# Patient Record
Sex: FEMALE | Race: WHITE | NOT HISPANIC OR LATINO | Employment: UNEMPLOYED | URBAN - METROPOLITAN AREA
[De-identification: names, ages, dates, MRNs, and addresses within clinical notes are randomized per-mention and may not be internally consistent; named-entity substitution may affect disease eponyms.]

---

## 2023-06-06 ENCOUNTER — OFFICE VISIT (OUTPATIENT)
Dept: ORTHOPEDICS | Facility: CLINIC | Age: 68
End: 2023-06-06
Payer: MEDICARE

## 2023-06-06 VITALS — WEIGHT: 236 LBS | HEIGHT: 61 IN | BODY MASS INDEX: 44.56 KG/M2

## 2023-06-06 DIAGNOSIS — S82.154A CLOSED NONDISPLACED FRACTURE OF RIGHT TIBIAL TUBEROSITY, INITIAL ENCOUNTER: Primary | ICD-10-CM

## 2023-06-06 DIAGNOSIS — S82.154A CLOSED NONDISPLACED FRACTURE OF RIGHT TIBIAL TUBEROSITY, INITIAL ENCOUNTER: ICD-10-CM

## 2023-06-06 PROCEDURE — 99999 PR PBB SHADOW E&M-EST. PATIENT-LVL III: CPT | Mod: PBBFAC,,, | Performed by: ORTHOPAEDIC SURGERY

## 2023-06-06 PROCEDURE — 99203 PR OFFICE/OUTPT VISIT, NEW, LEVL III, 30-44 MIN: ICD-10-PCS | Mod: S$PBB,,, | Performed by: ORTHOPAEDIC SURGERY

## 2023-06-06 PROCEDURE — 99213 OFFICE O/P EST LOW 20 MIN: CPT | Mod: PBBFAC,PN | Performed by: ORTHOPAEDIC SURGERY

## 2023-06-06 PROCEDURE — 99999 PR PBB SHADOW E&M-EST. PATIENT-LVL III: ICD-10-PCS | Mod: PBBFAC,,, | Performed by: ORTHOPAEDIC SURGERY

## 2023-06-06 PROCEDURE — 99203 OFFICE O/P NEW LOW 30 MIN: CPT | Mod: S$PBB,,, | Performed by: ORTHOPAEDIC SURGERY

## 2023-06-06 NOTE — PROGRESS NOTES
Subjective:      Patient ID: Natalie Davis is a 68 y.o. female.    Chief Complaint: Injury, Pain, and Numbness of the Right Knee     68-year-old female presents for closed tibial tubercle fracture of right knee she slipped and fell yesterday onto the knee she presented to the emergency room she was placed in an immobilizer and here for follow up she is also a diabetic    Injury    Pain    Review of Systems   Musculoskeletal:  Positive for joint pain.       Objective:    Ortho Exam      Examination of the right knee the immobilizer was removed the skin was inspected she does have a bit of a abrasion over the tibial tubercle she has tenderness over the tibial tubercle otherwise neurovascularly intact        X-Ray Tibia Fibula 2 View Right  Narrative: EXAMINATION:  XR KNEE 3 VIEW RIGHT; XR TIBIA FIBULA 2 VIEW RIGHT    CLINICAL HISTORY:  Right knee injury.  Right leg injury    TECHNIQUE:  Four views of the right knee.    AP and lateral views of the right tibia and fibula.    COMPARISON:  None available    FINDINGS:  There does appear to be a nondisplaced fracture at the tibial tubercle level.  There is no subluxation or dislocation identified.  There is no significant joint effusion at the knee.  There are degenerative changes noted of the knee.  The visualized right ankle appears intact.  No radiopaque foreign body is noted.  Impression: 1. Nondisplaced fracture of the tibial tubercle.    Electronically signed by: Aman Pham MD  Date:    06/05/2023  Time:    16:53  X-Ray Knee 3 View Right  Narrative: EXAMINATION:  XR KNEE 3 VIEW RIGHT; XR TIBIA FIBULA 2 VIEW RIGHT    CLINICAL HISTORY:  Right knee injury.  Right leg injury    TECHNIQUE:  Four views of the right knee.    AP and lateral views of the right tibia and fibula.    COMPARISON:  None available    FINDINGS:  There does appear to be a nondisplaced fracture at the tibial tubercle level.  There is no subluxation or dislocation identified.  There is no  significant joint effusion at the knee.  There are degenerative changes noted of the knee.  The visualized right ankle appears intact.  No radiopaque foreign body is noted.  Impression: 1. Nondisplaced fracture of the tibial tubercle.    Electronically signed by: Aman Pham MD  Date:    06/05/2023  Time:    16:53  X-Ray Pelvis Routine AP  Narrative: EXAMINATION:  XR PELVIS ROUTINE AP    CLINICAL HISTORY:  Right hip pain s/p fall.    TECHNIQUE:  AP view of the pelvis was performed.    COMPARISON:  None.    FINDINGS:  No acute displaced fracture.  No dislocation or subluxation.  Sacroiliac joints and pubic symphysis are not widened.  No radiopaque soft tissue foreign body or acute soft tissue abnormality.  Impression: No definite acute displaced fracture or dislocation.    Electronically signed by: Anuel Lozano MD  Date:    06/05/2023  Time:    16:52  X-Ray Femur Ap/Lat Right  Narrative: EXAMINATION:  XR FEMUR 2 VIEW RIGHT    CLINICAL HISTORY:  Fall.    TECHNIQUE:  AP and lateral views of the right femur were performed.    COMPARISON:  None    FINDINGS:  No acute fracture.  No dislocation or subluxation.  No radiopaque foreign body or acute soft tissue abnormality.  Atherosclerotic calcifications are noted..  Impression: No definite evidence of acute displaced fracture or dislocation.    Electronically signed by: Anuel Lozano MD  Date:    06/05/2023  Time:    16:52  CT Head Without Contrast  Narrative: EXAMINATION:  CT HEAD WITHOUT CONTRAST    CLINICAL HISTORY:  Head trauma, minor.  Fall.    TECHNIQUE:  Low dose axial images were obtained through the head.  Coronal and sagittal reformations were also performed. Contrast was not administered.  Dose reduction techniques including automatic exposure control (AEC) were utilized.    Dose (DLP): 599 mGycm    COMPARISON:  CT head without contrast, 11/26/2020.    FINDINGS:  INTRACRANIAL: Stable age-appropriate parenchymal volume.  Scattered white matter  hypodensities, likely related to chronic microvascular ischemic change.  Gray-white differentiation is preserved.  No acute intracranial hemorrhage.  No hydrocephalus.  No intracranial mass effect.    SINUSES: Visualized paranasal sinuses are clear.  Trace bilateral mastoid fluid.    SKULL/SCALP: Visualized osseous structures are normal.    ORBITS: Mild bilateral proptosis, similar to prior study.  Impression: No acute intracranial abnormality.    Electronically signed by: Anuel Lozano MD  Date:    06/05/2023  Time:    16:05       My Findings:    X-Ray:  minimally displaced tibial tubercle fracture    MRI Review: N/A      Assessment:       Encounter Diagnosis   Name Primary?    Closed nondisplaced fracture of right tibial tuberosity, initial encounter          Plan:          We adjusted her immobilizer today she will remain in the immobilizer for 2 weeks I will follow up with her in 2 weeks' time and repeat x rays at that time    No orders of the defined types were placed in this encounter.            Past Medical History:   Diagnosis Date    Asthma     Diabetes mellitus     DM1    Hyperkalemia     Hyperlipemia     Hypertension     Renal disorder     Thyroid disease      Past Surgical History:   Procedure Laterality Date    CHOLECYSTECTOMY      COLON SURGERY      HYSTERECTOMY      NECK SURGERY      TONSILLECTOMY           Current Outpatient Medications:     diclofenac sodium (VOLTAREN) 1 % Gel, Apply 2 g topically 3 (three) times daily as needed., Disp: 100 g, Rfl: 0    HYDROcodone-acetaminophen (NORCO) 5-325 mg per tablet, Take 1 tablet by mouth every 6 (six) hours as needed for Pain., Disp: 20 tablet, Rfl: 0    lidocaine (LIDODERM) 5 %, Place 1 patch onto the skin once daily. Remove & Discard patch within 12 hours or as directed by MD, Disp: 10 patch, Rfl: 0  No current facility-administered medications for this visit.    Review of patient's allergies indicates:  No Known Allergies    History reviewed. No  pertinent family history.  Social History     Occupational History    Not on file   Tobacco Use    Smoking status: Former     Types: Cigarettes     Quit date: 2015     Years since quittin.5    Smokeless tobacco: Never   Substance and Sexual Activity    Alcohol use: Not Currently    Drug use: Not Currently    Sexual activity: Not Currently       JANIE Brice MD

## 2023-06-19 DIAGNOSIS — S82.154A CLOSED NONDISPLACED FRACTURE OF RIGHT TIBIAL TUBEROSITY, INITIAL ENCOUNTER: Primary | ICD-10-CM

## 2023-06-20 ENCOUNTER — HOSPITAL ENCOUNTER (OUTPATIENT)
Dept: RADIOLOGY | Facility: HOSPITAL | Age: 68
Discharge: HOME | End: 2023-06-20
Attending: ORTHOPAEDIC SURGERY
Payer: MEDICARE

## 2023-06-20 ENCOUNTER — OFFICE VISIT (OUTPATIENT)
Dept: ORTHOPEDICS | Facility: CLINIC | Age: 68
End: 2023-06-20
Payer: MEDICARE

## 2023-06-20 DIAGNOSIS — S82.154A CLOSED NONDISPLACED FRACTURE OF RIGHT TIBIAL TUBEROSITY, INITIAL ENCOUNTER: Primary | ICD-10-CM

## 2023-06-20 DIAGNOSIS — S82.154A CLOSED NONDISPLACED FRACTURE OF RIGHT TIBIAL TUBEROSITY, INITIAL ENCOUNTER: ICD-10-CM

## 2023-06-20 PROCEDURE — 73560 XR KNEE ORTHO RIGHT: ICD-10-PCS | Mod: 26,LT,, | Performed by: RADIOLOGY

## 2023-06-20 PROCEDURE — 99214 OFFICE O/P EST MOD 30 MIN: CPT | Mod: S$PBB,,, | Performed by: ORTHOPAEDIC SURGERY

## 2023-06-20 PROCEDURE — 99214 PR OFFICE/OUTPT VISIT, EST, LEVL IV, 30-39 MIN: ICD-10-PCS | Mod: S$PBB,,, | Performed by: ORTHOPAEDIC SURGERY

## 2023-06-20 PROCEDURE — 99999 PR PBB SHADOW E&M-EST. PATIENT-LVL II: ICD-10-PCS | Mod: PBBFAC,,, | Performed by: ORTHOPAEDIC SURGERY

## 2023-06-20 PROCEDURE — 99212 OFFICE O/P EST SF 10 MIN: CPT | Mod: PBBFAC,PN | Performed by: ORTHOPAEDIC SURGERY

## 2023-06-20 PROCEDURE — 73562 X-RAY EXAM OF KNEE 3: CPT | Mod: 26,RT,, | Performed by: RADIOLOGY

## 2023-06-20 PROCEDURE — 73560 X-RAY EXAM OF KNEE 1 OR 2: CPT | Mod: TC,PO,LT

## 2023-06-20 PROCEDURE — 99999 PR PBB SHADOW E&M-EST. PATIENT-LVL II: CPT | Mod: PBBFAC,,, | Performed by: ORTHOPAEDIC SURGERY

## 2023-06-20 PROCEDURE — 73560 X-RAY EXAM OF KNEE 1 OR 2: CPT | Mod: 26,LT,, | Performed by: RADIOLOGY

## 2023-06-20 PROCEDURE — 73562 XR KNEE ORTHO RIGHT: ICD-10-PCS | Mod: 26,RT,, | Performed by: RADIOLOGY

## 2023-06-20 NOTE — PROGRESS NOTES
Subjective:      Patient ID: Natalie Davis is a 68 y.o. female.    Chief Complaint: Pain and Injury of the Right Knee    68-year-old female with minimally displaced right tibial tubercle fracture presents for follow-up reports she is still having some pain but it is improving    Pain    Injury    Review of Systems   Musculoskeletal:  Positive for joint pain.       Objective:    Ortho Exam     Examination still demonstrates tenderness over the tibial tubercle but her extensor mechanism is intact        X-ray Knee Ortho Right  Narrative: EXAMINATION:  XR KNEE ORTHO RIGHT    CLINICAL HISTORY:  Nondisplaced fracture of right tibial tuberosity, initial encounter for closed fracture    TECHNIQUE:  AP standing of both knees, Merchant views of both knees as well as a lateral view of the right knee were performed.    COMPARISON:  None    FINDINGS:  There is degenerative arthrosis of the medial compartment of the right knee and lateral compartment of the left knee with joint space narrowing and periarticular osteophyte formation.  There is no significant change in cortical fracture of the anterior tibial tubercle.  There is no significant overlying soft tissue swelling.  The patellofemoral articulation is well maintained.  There are no signs of right knee joint effusion.  Impression: As above.    Electronically signed by: Easton Muse MD  Date:    06/20/2023  Time:    13:18       My Findings:    X-Ray:  Independent review of x-rays show no no further displacement of the tibial    MRI Review: N/A      Assessment:       Encounter Diagnosis   Name Primary?    Closed nondisplaced fracture of right tibial tuberosity, initial encounter Yes         Plan:         Patient is doing well with conservative care she will continue using an assistive device and physical therapy she is going to follow up with physician closer to her home    No orders of the defined types were placed in this encounter.            Past Medical History:    Diagnosis Date    Asthma     Diabetes mellitus     DM1    Hyperkalemia     Hyperlipemia     Hypertension     Renal disorder     Thyroid disease      Past Surgical History:   Procedure Laterality Date    CHOLECYSTECTOMY      COLON SURGERY      HYSTERECTOMY      NECK SURGERY      TONSILLECTOMY           Current Outpatient Medications:     diclofenac sodium (VOLTAREN) 1 % Gel, Apply 2 g topically 3 (three) times daily as needed., Disp: 100 g, Rfl: 0    HYDROcodone-acetaminophen (NORCO) 5-325 mg per tablet, Take 1 tablet by mouth every 6 (six) hours as needed for Pain., Disp: 20 tablet, Rfl: 0    lidocaine (LIDODERM) 5 %, Place 1 patch onto the skin once daily. Remove & Discard patch within 12 hours or as directed by MD, Disp: 10 patch, Rfl: 0    Review of patient's allergies indicates:  No Known Allergies    History reviewed. No pertinent family history.  Social History     Occupational History    Not on file   Tobacco Use    Smoking status: Former     Types: Cigarettes     Quit date: 2015     Years since quittin.5    Smokeless tobacco: Never   Substance and Sexual Activity    Alcohol use: Not Currently    Drug use: Not Currently    Sexual activity: Not Currently       JANIE Brice MD

## 2023-08-29 PROBLEM — F17.201 TOBACCO ABUSE, IN REMISSION: Status: ACTIVE | Noted: 2023-08-29

## 2023-08-29 PROBLEM — K44.9 HIATAL HERNIA: Status: ACTIVE | Noted: 2023-08-29

## 2023-08-29 PROBLEM — R13.14 PHARYNGOESOPHAGEAL DYSPHAGIA: Status: ACTIVE | Noted: 2023-08-29

## 2023-08-29 PROBLEM — J84.9 INTERSTITIAL LUNG DISEASE: Status: ACTIVE | Noted: 2023-08-29

## 2023-08-29 PROBLEM — R91.8 GROUND GLASS OPACITY PRESENT ON IMAGING OF LUNG: Status: ACTIVE | Noted: 2023-08-29

## 2023-08-29 PROBLEM — G47.10 EXCESSIVE SLEEPINESS: Status: ACTIVE | Noted: 2023-08-29

## 2023-10-04 PROBLEM — I27.23 WHO GROUP 3 PULMONARY ARTERIAL HYPERTENSION: Status: ACTIVE | Noted: 2023-10-04

## 2023-10-04 PROBLEM — J43.2 CENTRIACINAR EMPHYSEMA: Status: ACTIVE | Noted: 2023-10-04

## 2024-03-21 PROBLEM — Z79.4 TYPE 2 DIABETES MELLITUS WITH DIABETIC PERIPHERAL ANGIOPATHY WITHOUT GANGRENE, WITH LONG-TERM CURRENT USE OF INSULIN: Status: ACTIVE | Noted: 2024-03-21

## 2024-03-21 PROBLEM — E78.00 HYPERCHOLESTEROLEMIA: Status: ACTIVE | Noted: 2024-03-21

## 2024-03-21 PROBLEM — R94.2 ABNORMAL PFTS: Status: ACTIVE | Noted: 2024-03-21

## 2024-03-21 PROBLEM — E11.51 TYPE 2 DIABETES MELLITUS WITH DIABETIC PERIPHERAL ANGIOPATHY WITHOUT GANGRENE, WITH LONG-TERM CURRENT USE OF INSULIN: Status: ACTIVE | Noted: 2024-03-21

## 2024-03-21 PROBLEM — E03.9 ACQUIRED HYPOTHYROIDISM: Status: ACTIVE | Noted: 2024-03-21

## 2024-07-19 PROBLEM — E66.01 SEVERE OBESITY (BMI >= 40): Status: ACTIVE | Noted: 2024-07-19

## 2024-07-19 PROBLEM — R80.9 PROTEINURIA: Status: ACTIVE | Noted: 2024-07-19

## 2024-07-19 PROBLEM — E78.5 HYPERLIPIDEMIA: Status: ACTIVE | Noted: 2024-07-19

## 2024-07-19 PROBLEM — I50.31 ACUTE DIASTOLIC HEART FAILURE: Status: ACTIVE | Noted: 2024-07-19

## 2024-07-19 PROBLEM — G47.33 OBSTRUCTIVE SLEEP APNEA: Status: ACTIVE | Noted: 2024-07-19

## 2024-07-19 PROBLEM — J84.9 ILD (INTERSTITIAL LUNG DISEASE): Status: ACTIVE | Noted: 2024-07-19

## 2024-07-19 PROBLEM — E03.9 HYPOTHYROIDISM: Status: ACTIVE | Noted: 2024-07-19

## 2024-07-19 PROBLEM — D72.829 LEUKOCYTOSIS: Status: ACTIVE | Noted: 2024-07-19

## 2024-07-19 PROBLEM — Z71.89 ACP (ADVANCE CARE PLANNING): Status: ACTIVE | Noted: 2024-07-19

## 2024-07-19 PROBLEM — I10 HYPERTENSION: Status: ACTIVE | Noted: 2024-07-19

## 2024-07-19 PROBLEM — E11.9 DIABETES MELLITUS: Status: ACTIVE | Noted: 2024-07-19

## 2024-07-19 PROBLEM — J44.9 COPD (CHRONIC OBSTRUCTIVE PULMONARY DISEASE): Status: ACTIVE | Noted: 2024-07-19

## 2024-07-20 PROBLEM — R79.89 ELEVATED TROPONIN: Status: ACTIVE | Noted: 2024-07-20

## 2024-07-20 PROBLEM — J96.10 CHRONIC RESPIRATORY FAILURE: Status: ACTIVE | Noted: 2024-07-20

## 2024-07-29 ENCOUNTER — OFFICE VISIT (OUTPATIENT)
Dept: UROLOGY | Facility: CLINIC | Age: 69
End: 2024-07-29
Payer: MEDICARE

## 2024-07-29 ENCOUNTER — TELEPHONE (OUTPATIENT)
Dept: UROLOGY | Facility: CLINIC | Age: 69
End: 2024-07-29
Payer: MEDICARE

## 2024-07-29 VITALS — WEIGHT: 243.63 LBS | BODY MASS INDEX: 46 KG/M2 | HEIGHT: 61 IN

## 2024-07-29 DIAGNOSIS — R32 URINARY INCONTINENCE, UNSPECIFIED TYPE: Primary | ICD-10-CM

## 2024-07-29 LAB — POC RESIDUAL URINE VOLUME: 0 ML (ref 0–100)

## 2024-07-29 PROCEDURE — 3288F FALL RISK ASSESSMENT DOCD: CPT | Mod: CPTII,S$GLB,, | Performed by: UROLOGY

## 2024-07-29 PROCEDURE — 4010F ACE/ARB THERAPY RXD/TAKEN: CPT | Mod: CPTII,S$GLB,, | Performed by: UROLOGY

## 2024-07-29 PROCEDURE — 99999 PR PBB SHADOW E&M-EST. PATIENT-LVL III: CPT | Mod: PBBFAC,,, | Performed by: UROLOGY

## 2024-07-29 PROCEDURE — 51798 US URINE CAPACITY MEASURE: CPT | Mod: S$GLB,,, | Performed by: UROLOGY

## 2024-07-29 PROCEDURE — 3008F BODY MASS INDEX DOCD: CPT | Mod: CPTII,S$GLB,, | Performed by: UROLOGY

## 2024-07-29 PROCEDURE — 1111F DSCHRG MED/CURRENT MED MERGE: CPT | Mod: CPTII,S$GLB,, | Performed by: UROLOGY

## 2024-07-29 PROCEDURE — 99204 OFFICE O/P NEW MOD 45 MIN: CPT | Mod: S$GLB,,, | Performed by: UROLOGY

## 2024-07-29 PROCEDURE — 1101F PT FALLS ASSESS-DOCD LE1/YR: CPT | Mod: CPTII,S$GLB,, | Performed by: UROLOGY

## 2024-07-29 PROCEDURE — 3051F HG A1C>EQUAL 7.0%<8.0%: CPT | Mod: CPTII,S$GLB,, | Performed by: UROLOGY

## 2024-07-29 PROCEDURE — 1125F AMNT PAIN NOTED PAIN PRSNT: CPT | Mod: CPTII,S$GLB,, | Performed by: UROLOGY

## 2024-07-29 RX ORDER — LIDOCAINE HYDROCHLORIDE 20 MG/ML
JELLY TOPICAL ONCE
OUTPATIENT
Start: 2024-07-29 | End: 2024-07-29

## 2024-07-29 NOTE — TELEPHONE ENCOUNTER
7/29/24 @ 4:15 pm called patient to schedule in VUDS/CYSTO call was successful and I was able to schedule patient in.

## 2024-07-31 NOTE — PROGRESS NOTES
[facility]      New Overactive Bladder (OAB) Note    7/31/2024    Referred by:  Self, Aaareferral      CHIEF COMPLAINT:  Urinary frequency, urgency, and dysuria.        Patient with history of OAB including frequency 15-20x, nocturia 5-6x and UUI saturating 2-3 pullups per day. She denies voiding difficulty. Previous history of Botox and also prior synthetic MUS.     A review of 10+ systems was conducted with pertinent positive and negative findings documented in HPI with all other systems reviewed and negative.    Past medical, family, surgical and social history reviewed as documented in chart with pertinent positive medical, family, surgical and social history detailed in HPI.    Exam Findings:    Physical Exam    General: No acute distress. Nontoxic appearing.  HENT: Normocephalic. Atraumatic.  Respiratory: Normal respiratory effort. No conversational dyspnea. No audible wheezing.  Abdomen: No obvious distension.  Skin: No visible abnormalities.  Extremities: No edema upper extremities. No edema lower extremities.  Neurological: Alert and oriented x3. Normal speech.  Psychiatric: Normal mood. Normal affect. No evidence of SI.          Assessment/Plan:    IMPRESSION:  - Patient has severe overactive bladder symptoms, voiding 15-20 times per day and 10 times at night, with urge incontinence and saturating 2-3 pull-ups daily  - Prior Botox injection and bladder surgery   - Plan urodynamic study and cystoscopy to further evaluate bladder function and rule out other potential contributing factors before proceeding with further treatment  - If workup confirms isolated overactive bladder, may consider repeat Botox injection at higher dose or sacral neuromodulation as next steps  OVERACTIVE BLADDER:  - Continued oxybutynin ER 15 mg daily, though patient reports it is not helping much and causes dry mouth and dry eyes.  - Urodynamic study and cystoscopy to be scheduled and performed in the next 2  weeks.  FOLLOW UP:  - Follow up after the urodynamic study is completed to review the results and discuss further treatment options.  - Contact the office if there are any cancellations in the next 2 weeks to move the appointment up.

## 2024-08-23 ENCOUNTER — TELEPHONE (OUTPATIENT)
Facility: CLINIC | Age: 69
End: 2024-08-23
Payer: MEDICARE

## 2024-08-23 NOTE — TELEPHONE ENCOUNTER
Attempted to contact pt to confirm arrival time for scheduled procedure on 8/28 at 1415 for Dr. Cardona at [facility]. Left pt a voicemail.

## 2024-08-27 ENCOUNTER — TELEPHONE (OUTPATIENT)
Dept: UROLOGY | Facility: CLINIC | Age: 69
End: 2024-08-27
Payer: MEDICARE

## 2024-08-27 NOTE — TELEPHONE ENCOUNTER
8/27/24 @ 11:25 am called patients daughter regarding message in portal stated patient had a fall and needed to R/S in office procedure I was able to coordinate a better date for the patient to come in

## 2024-09-17 PROBLEM — R94.39 ABNORMAL NUCLEAR STRESS TEST: Status: ACTIVE | Noted: 2024-09-17

## 2024-09-25 PROBLEM — G47.19 EXCESSIVE DAYTIME SLEEPINESS: Status: ACTIVE | Noted: 2024-09-25

## 2024-09-25 PROBLEM — Z72.821 POOR SLEEP HYGIENE: Status: ACTIVE | Noted: 2024-09-25

## 2024-09-25 PROBLEM — I50.32 CHRONIC DIASTOLIC HEART FAILURE: Status: ACTIVE | Noted: 2024-09-25

## 2024-09-30 ENCOUNTER — TELEPHONE (OUTPATIENT)
Facility: CLINIC | Age: 69
End: 2024-09-30
Payer: MEDICARE

## 2024-10-21 PROBLEM — J96.10 CHRONIC RESPIRATORY FAILURE: Status: RESOLVED | Noted: 2024-07-20 | Resolved: 2024-10-21

## 2024-10-22 PROBLEM — Z72.821 POOR SLEEP HYGIENE: Status: RESOLVED | Noted: 2024-09-25 | Resolved: 2024-10-22

## 2024-10-22 PROBLEM — I50.32 CHRONIC DIASTOLIC HEART FAILURE: Status: RESOLVED | Noted: 2024-09-25 | Resolved: 2024-10-22

## 2024-10-22 PROBLEM — R91.8 GROUND GLASS OPACITY PRESENT ON IMAGING OF LUNG: Status: RESOLVED | Noted: 2023-08-29 | Resolved: 2024-10-22

## 2024-10-22 PROBLEM — E03.9 ACQUIRED HYPOTHYROIDISM: Status: RESOLVED | Noted: 2024-03-21 | Resolved: 2024-10-22

## 2024-10-22 PROBLEM — E11.51 TYPE 2 DIABETES MELLITUS WITH DIABETIC PERIPHERAL ANGIOPATHY WITHOUT GANGRENE, WITH LONG-TERM CURRENT USE OF INSULIN: Status: RESOLVED | Noted: 2024-03-21 | Resolved: 2024-10-22

## 2024-10-22 PROBLEM — J96.01 ACUTE HYPOXIC RESPIRATORY FAILURE: Status: ACTIVE | Noted: 2024-10-22

## 2024-10-22 PROBLEM — Z79.4 TYPE 2 DIABETES MELLITUS WITH DIABETIC PERIPHERAL ANGIOPATHY WITHOUT GANGRENE, WITH LONG-TERM CURRENT USE OF INSULIN: Status: RESOLVED | Noted: 2024-03-21 | Resolved: 2024-10-22

## 2024-10-22 PROBLEM — K44.9 HIATAL HERNIA: Status: RESOLVED | Noted: 2023-08-29 | Resolved: 2024-10-22

## 2024-10-22 PROBLEM — R79.89 ELEVATED TROPONIN: Status: RESOLVED | Noted: 2024-07-20 | Resolved: 2024-10-22

## 2024-10-22 PROBLEM — R13.14 PHARYNGOESOPHAGEAL DYSPHAGIA: Status: RESOLVED | Noted: 2023-08-29 | Resolved: 2024-10-22

## 2024-10-22 PROBLEM — R94.2 ABNORMAL PFTS: Status: RESOLVED | Noted: 2024-03-21 | Resolved: 2024-10-22

## 2024-10-22 PROBLEM — G47.19 EXCESSIVE DAYTIME SLEEPINESS: Status: RESOLVED | Noted: 2024-09-25 | Resolved: 2024-10-22

## 2024-10-22 PROBLEM — R80.9 PROTEINURIA: Status: RESOLVED | Noted: 2024-07-19 | Resolved: 2024-10-22

## 2024-10-22 PROBLEM — E78.00 HYPERCHOLESTEROLEMIA: Status: RESOLVED | Noted: 2024-03-21 | Resolved: 2024-10-22

## 2024-10-22 PROBLEM — R94.39 ABNORMAL NUCLEAR STRESS TEST: Status: RESOLVED | Noted: 2024-09-17 | Resolved: 2024-10-22

## 2024-10-22 PROBLEM — G47.10 EXCESSIVE SLEEPINESS: Status: RESOLVED | Noted: 2023-08-29 | Resolved: 2024-10-22

## 2024-10-22 PROBLEM — I27.23 WHO GROUP 3 PULMONARY ARTERIAL HYPERTENSION: Status: RESOLVED | Noted: 2023-10-04 | Resolved: 2024-10-22

## 2024-10-22 PROBLEM — E78.5 HYPERLIPIDEMIA: Status: RESOLVED | Noted: 2024-07-19 | Resolved: 2024-10-22

## 2024-10-23 PROBLEM — R00.1 BRADYCARDIA: Status: ACTIVE | Noted: 2024-10-23

## 2024-10-23 PROBLEM — J96.20 ACUTE ON CHRONIC RESPIRATORY FAILURE: Status: ACTIVE | Noted: 2024-10-22

## 2024-10-23 PROBLEM — R19.5 DARK STOOLS: Status: ACTIVE | Noted: 2024-10-23

## 2024-10-23 PROBLEM — I25.10 CORONARY ARTERY DISEASE INVOLVING NATIVE CORONARY ARTERY OF NATIVE HEART WITHOUT ANGINA PECTORIS: Status: ACTIVE | Noted: 2024-10-23

## 2024-10-24 PROBLEM — N17.9 AKI (ACUTE KIDNEY INJURY): Status: ACTIVE | Noted: 2024-10-24

## 2024-10-24 PROBLEM — Z73.6 LIMITATION OF ACTIVITY DUE TO DISABILITY: Status: ACTIVE | Noted: 2024-10-24

## 2024-10-24 PROBLEM — I16.1 HYPERTENSIVE EMERGENCY: Status: ACTIVE | Noted: 2024-07-19

## 2024-10-28 PROBLEM — N17.9 AKI (ACUTE KIDNEY INJURY): Status: RESOLVED | Noted: 2024-10-24 | Resolved: 2024-10-28

## 2024-11-30 PROBLEM — J96.22 ACUTE ON CHRONIC RESPIRATORY FAILURE WITH HYPOXIA AND HYPERCAPNIA: Status: ACTIVE | Noted: 2024-10-22

## 2024-11-30 PROBLEM — J44.89 ASTHMA WITH COPD: Status: ACTIVE | Noted: 2024-07-19

## 2024-11-30 PROBLEM — D64.9 CHRONIC ANEMIA: Status: ACTIVE | Noted: 2024-11-30

## 2024-11-30 PROBLEM — Z95.5 STENTED CORONARY ARTERY: Status: ACTIVE | Noted: 2024-11-30

## 2024-11-30 PROBLEM — I31.39 PERICARDIAL EFFUSION: Status: ACTIVE | Noted: 2024-11-30

## 2024-11-30 PROBLEM — E83.42 HYPOMAGNESEMIA: Status: ACTIVE | Noted: 2024-11-30

## 2024-11-30 PROBLEM — E66.01 MORBID OBESITY: Status: ACTIVE | Noted: 2024-11-30

## 2024-11-30 PROBLEM — R82.71 BACTERIURIA: Status: ACTIVE | Noted: 2024-11-30

## 2024-11-30 PROBLEM — N18.30 CKD (CHRONIC KIDNEY DISEASE), STAGE III: Status: ACTIVE | Noted: 2024-11-30

## 2024-11-30 PROBLEM — Z79.899 PATIENT TAKING UNKNOWN MEDICATIONS: Status: ACTIVE | Noted: 2024-11-30

## 2024-11-30 PROBLEM — J84.9 ILD (INTERSTITIAL LUNG DISEASE): Status: ACTIVE | Noted: 2024-11-30

## 2024-11-30 PROBLEM — I50.33 ACUTE ON CHRONIC DIASTOLIC CHF (CONGESTIVE HEART FAILURE): Status: ACTIVE | Noted: 2024-07-19

## 2024-11-30 PROBLEM — E11.65 TYPE 2 DIABETES MELLITUS WITH HYPERGLYCEMIA: Status: ACTIVE | Noted: 2024-11-30

## 2024-11-30 PROBLEM — I35.0 NONRHEUMATIC AORTIC VALVE STENOSIS: Status: ACTIVE | Noted: 2024-11-30

## 2024-11-30 PROBLEM — J96.21 ACUTE ON CHRONIC RESPIRATORY FAILURE WITH HYPOXIA AND HYPERCAPNIA: Status: ACTIVE | Noted: 2024-10-22

## 2024-11-30 PROBLEM — R93.89 ABNORMAL CT OF THE CHEST: Status: ACTIVE | Noted: 2024-11-30

## 2025-01-15 PROBLEM — I10 HTN (HYPERTENSION): Status: ACTIVE | Noted: 2025-01-15

## 2025-01-15 PROBLEM — R00.1 SYMPTOMATIC BRADYCARDIA: Status: ACTIVE | Noted: 2025-01-15

## 2025-01-15 PROBLEM — N17.9 ACUTE KIDNEY INJURY SUPERIMPOSED ON CHRONIC KIDNEY DISEASE: Status: ACTIVE | Noted: 2025-01-15

## 2025-01-15 PROBLEM — N18.9 ACUTE KIDNEY INJURY SUPERIMPOSED ON CHRONIC KIDNEY DISEASE: Status: ACTIVE | Noted: 2025-01-15

## 2025-01-15 PROBLEM — N39.0 UTI (URINARY TRACT INFECTION): Status: ACTIVE | Noted: 2025-01-15

## 2025-01-15 PROBLEM — E87.5 HYPERKALEMIA: Status: ACTIVE | Noted: 2025-01-15

## 2025-02-07 ENCOUNTER — HOSPITAL ENCOUNTER (OUTPATIENT)
Dept: RADIOLOGY | Facility: HOSPITAL | Age: 70
Discharge: HOME | End: 2025-02-07
Attending: ANESTHESIOLOGY
Payer: MEDICARE

## 2025-02-07 ENCOUNTER — OFFICE VISIT (OUTPATIENT)
Dept: PAIN MEDICINE | Facility: CLINIC | Age: 70
End: 2025-02-07
Payer: MEDICARE

## 2025-02-07 VITALS
SYSTOLIC BLOOD PRESSURE: 187 MMHG | DIASTOLIC BLOOD PRESSURE: 77 MMHG | BODY MASS INDEX: 41.86 KG/M2 | WEIGHT: 221.56 LBS | HEART RATE: 60 BPM

## 2025-02-07 DIAGNOSIS — M54.9 DORSALGIA, UNSPECIFIED: ICD-10-CM

## 2025-02-07 DIAGNOSIS — R52 ACUTE PAIN: ICD-10-CM

## 2025-02-07 DIAGNOSIS — M54.9 DORSALGIA, UNSPECIFIED: Primary | ICD-10-CM

## 2025-02-07 DIAGNOSIS — M54.16 LUMBAR RADICULOPATHY: ICD-10-CM

## 2025-02-07 PROCEDURE — 3288F FALL RISK ASSESSMENT DOCD: CPT | Mod: CPTII,S$GLB,, | Performed by: ANESTHESIOLOGY

## 2025-02-07 PROCEDURE — 1111F DSCHRG MED/CURRENT MED MERGE: CPT | Mod: CPTII,S$GLB,, | Performed by: ANESTHESIOLOGY

## 2025-02-07 PROCEDURE — 3077F SYST BP >= 140 MM HG: CPT | Mod: CPTII,S$GLB,, | Performed by: ANESTHESIOLOGY

## 2025-02-07 PROCEDURE — 72114 X-RAY EXAM L-S SPINE BENDING: CPT | Mod: 26,,, | Performed by: RADIOLOGY

## 2025-02-07 PROCEDURE — 1125F AMNT PAIN NOTED PAIN PRSNT: CPT | Mod: CPTII,S$GLB,, | Performed by: ANESTHESIOLOGY

## 2025-02-07 PROCEDURE — 3078F DIAST BP <80 MM HG: CPT | Mod: CPTII,S$GLB,, | Performed by: ANESTHESIOLOGY

## 2025-02-07 PROCEDURE — 99204 OFFICE O/P NEW MOD 45 MIN: CPT | Mod: S$GLB,,, | Performed by: ANESTHESIOLOGY

## 2025-02-07 PROCEDURE — 72114 X-RAY EXAM L-S SPINE BENDING: CPT | Mod: TC,PO

## 2025-02-07 PROCEDURE — 1100F PTFALLS ASSESS-DOCD GE2>/YR: CPT | Mod: CPTII,S$GLB,, | Performed by: ANESTHESIOLOGY

## 2025-02-07 PROCEDURE — 1159F MED LIST DOCD IN RCRD: CPT | Mod: CPTII,S$GLB,, | Performed by: ANESTHESIOLOGY

## 2025-02-07 PROCEDURE — 3008F BODY MASS INDEX DOCD: CPT | Mod: CPTII,S$GLB,, | Performed by: ANESTHESIOLOGY

## 2025-02-07 PROCEDURE — 99999 PR PBB SHADOW E&M-EST. PATIENT-LVL V: CPT | Mod: PBBFAC,,, | Performed by: ANESTHESIOLOGY

## 2025-02-07 PROCEDURE — 1160F RVW MEDS BY RX/DR IN RCRD: CPT | Mod: CPTII,S$GLB,, | Performed by: ANESTHESIOLOGY

## 2025-02-07 NOTE — PROGRESS NOTES
[facility] New Patient Evaluation      Referred by: Whitney Lara    PCP:     CC:   Chief Complaint   Patient presents with    Back Pain          2/7/2025     1:23 PM   Last 3 PDI Scores   Pain Disability Index (PDI) 39         HPI:   Natalie Davis is a 70 y.o. female patient who has a past medical history of Anemia, Asthma, Chronic diastolic CHF (congestive heart failure), Chronic respiratory failure with hypoxia, on home O2 therapy, CKD (chronic kidney disease), stage III, COPD (chronic obstructive pulmonary disease), Diabetes mellitus, GI bleed, Hyperkalemia, Hyperlipemia, Hypertension, Hypothyroidism, ILD (interstitial lung disease), Morbid obesity, Renal disorder, and Sleep apnea. She presents with back pain.  This has been a chronic problem her for many years.  She reports lower back pain that radiates down her left leg to the left foot.  Her pain is worse with sitting, standing, lying, bending, coughing, walking and can be relieved with some medications.  She use Tylenol on an as needed basis.      Pain Intervention History:      Past Spine Surgical History:      Past and current medications:  Antineuropathics:  NSAIDs:  Physical therapy: yes, completed   Antidepressants:  Muscle relaxers:  Opioids:  Antiplatelets/Anticoagulants: aspirin, plavix    History:    Current Outpatient Medications:     albuterol (PROVENTIL/VENTOLIN HFA) 90 mcg/actuation inhaler, Inhale 2 puffs into the lungs every 4 (four) hours as needed for Wheezing or Shortness of Breath., Disp: 18 g, Rfl: 4    albuterol-ipratropium (DUO-NEB) 2.5 mg-0.5 mg/3 mL nebulizer solution, Take 3 mLs by nebulization 3 (three) times daily., Disp: 150 mL, Rfl: 6    amLODIPine (NORVASC) 10 MG tablet, Take 1 tablet (10 mg total) by mouth once daily., Disp: 90 tablet, Rfl: 3    aspirin (ECOTRIN) 81 MG EC tablet, Take 1 tablet (81 mg total) by mouth once daily., Disp: , Rfl:     atorvastatin (LIPITOR) 40 MG tablet, Take 40 mg by mouth every  evening., Disp: , Rfl:     blood sugar diagnostic Strp, To check BG 4 times daily, to use with insurance preferred meter, Disp: 100 strip, Rfl: 1    blood-glucose meter kit, To check BG 4 times daily, to use with insurance preferred meter, Disp: 1 each, Rfl: 1    clopidogreL (PLAVIX) 75 mg tablet, Take 1 tablet (75 mg total) by mouth once daily., Disp: 90 tablet, Rfl: 1    diphenhydrAMINE-acetaminophen (TYLENOL PM)  mg Tab, Take 2 tablets by mouth nightly as needed (pain & sleep)., Disp: , Rfl:     ergocalciferol (VITAMIN D2) 50,000 unit Cap, Take 50,000 Units by mouth every Sunday., Disp: , Rfl:     EScitalopram oxalate (LEXAPRO) 10 MG tablet, Take 1 tablet (10 mg total) by mouth once daily. (Patient taking differently: Take 10 mg by mouth every morning.), Disp: 90 tablet, Rfl: 3    fluticasone-umeclidin-vilanter (TRELEGY ELLIPTA) 100-62.5-25 mcg DsDv, Inhale 1 puff into the lungs once daily., Disp: 1 each, Rfl: 11    furosemide (LASIX) 40 MG tablet, 1 tab daily x 3 days then 1 tab every other day, Disp: 30 tablet, Rfl: 3    insulin glargine U-100, Lantus, (LANTUS SOLOSTAR U-100 INSULIN) 100 unit/mL (3 mL) InPn pen, Inject 10 Units into the skin every evening., Disp: , Rfl:     lancets Misc, To check BG 4 times daily, to use with insurance preferred meter, Disp: 100 each, Rfl: 1    levothyroxine (SYNTHROID) 100 MCG tablet, Take 100 mcg by mouth before breakfast., Disp: , Rfl:     polyethylene glycol (GLYCOLAX) 17 gram PwPk, Take 17 g by mouth 2 (two) times daily. (Patient taking differently: Take 17 g by mouth as needed for Constipation.), Disp: , Rfl:     cloNIDine (CATAPRES) 0.1 MG tablet, TAKE 1 TABLET (0.1 MG TOTAL) BY MOUTH EVERY 6 (SIX) HOURS AS NEEDED (SBP >160)., Disp: 90 tablet, Rfl: 1    valsartan-hydrochlorothiazide (DIOVAN-HCT) 80-12.5 mg per tablet, Take 1 tablet by mouth once daily., Disp: 90 tablet, Rfl: 3    Past Medical History:   Diagnosis Date    Anemia     Asthma     Chronic diastolic CHF  (congestive heart failure)     Chronic respiratory failure with hypoxia, on home O2 therapy     2-3lpm    CKD (chronic kidney disease), stage III     COPD (chronic obstructive pulmonary disease)     Diabetes mellitus     type 2    GI bleed     Hyperkalemia     Hyperlipemia     Hypertension     Hypothyroidism     ILD (interstitial lung disease)     Morbid obesity     Renal disorder     Sleep apnea     does not use cpap       Past Surgical History:   Procedure Laterality Date    A-V CARDIAC PACEMAKER INSERTION  2025    Procedure: Dual Chamber PPM RM 2622 ([place]);  Surgeon: Timbo Fregoso MD;  Location: [facility] CATH;  Service: Cardiology;;    ATHERECTOMY, CORONARY  2024    Procedure: Atherectomy RCA;  Surgeon: Fabiano Espinoza MD;  Location: [facility];  Service: Cardiovascular;;    CHOLECYSTECTOMY      COLON SURGERY      CORONARY ANGIOGRAPHY  2024    Procedure: Coronary angiogram study;  Surgeon: Fabiano Espinoza MD;  Location: [facility];  Service: Cardiovascular;;    CORONARY STENT PLACEMENT  2024    Procedure: MELLO RCA;  Surgeon: Fabiano Espinoza MD;  Location: [facility];  Service: Cardiovascular;;    HYSTERECTOMY      INSTANTANEOUS WAVE-FREE RATIO (IFR)  2024    Procedure: (IFR) RCA;  Surgeon: Fabiano Espinoza MD;  Location: [facility];  Service: Cardiovascular;;    LEFT HEART CATHETERIZATION  2024    Procedure: Left heart cath;  Surgeon: Fabiano Espinoza MD;  Location: [facility];  Service: Cardiovascular;;    NECK SURGERY      TONSILLECTOMY         Family History   Problem Relation Name Age of Onset    Heart disease Mother      Heart disease Father      Cancer Brother      Cancer Maternal Grandfather         Social History     Socioeconomic History    Marital status: Single   Tobacco Use    Smoking status: Former     Current packs/day: 0.00     Types: Cigarettes     Quit date:      Years since quittin.1    Smokeless tobacco:  Never   Substance and Sexual Activity    Alcohol use: Not Currently    Sexual activity: Not Currently     Social Drivers of Health     Financial Resource Strain: Low Risk  (1/16/2025)    Overall Financial Resource Strain (CARDIA)     Difficulty of Paying Living Expenses: Not hard at all   Food Insecurity: No Food Insecurity (1/16/2025)    Hunger Vital Sign     Worried About Running Out of Food in the Last Year: Never true     Ran Out of Food in the Last Year: Never true   Transportation Needs: No Transportation Needs (1/16/2025)    TRANSPORTATION NEEDS     Transportation : No   Physical Activity: Inactive (7/24/2024)    Exercise Vital Sign     Days of Exercise per Week: 0 days     Minutes of Exercise per Session: 0 min   Stress: No Stress Concern Present (1/16/2025)    [employer] - Occupational Stress Questionnaire     Feeling of Stress : Only a little   Housing Stability: Low Risk  (1/16/2025)    Housing Stability Vital Sign     Unable to Pay for Housing in the Last Year: No     Homeless in the Last Year: No       Review of patient's allergies indicates:  No Known Allergies    Review of Systems:  12 point review of systems is negative.    Physical Exam:  Vitals:    02/07/25 1324   BP: (!) 187/77   Pulse: 60   Weight: 100.5 kg (221 lb 9 oz)   PainSc:   6   PainLoc: Back     Body mass index is 41.86 kg/m².    Gen: NAD  Psych: mood appropriate for given condition  HEENT: eyes anicteric   CV: RRR  HEENT: anicteric   Respiratory: non-labored, on portable O2  Abd: non-distended  Skin: warm, dry and intact.  Gait: antalgic gait.     Sensory:  Intact and symmetrical to light touch in L1-S1 dermatomes bilaterally.    Motor:     Right Left   L2/3 Iliacus Hip flexion  5  5   L3/4 Qudratus Femoris Knee Extension  5  5   L4/5 Tib Anterior Ankle Dorsiflexion   5  5   L5/S1 Extensor Hallicus Longus Great toe extension  5  5   S1/S2 Gastroc/Soleus Plantar Flexion  5  5       Labs:  Lab Results    Component Value Date    HGBA1C 6.9 (H) 10/22/2024       Lab Results   Component Value Date    WBC 8.94 01/18/2025    HGB 11.2 (L) 01/18/2025    HCT 36.0 (L) 01/18/2025    MCV 85 01/18/2025     01/18/2025           Imaging:  none    Diagnosis:  1. Dorsalgia, unspecified  -     X-Ray Lumbar Complete Including Flex And Ext; Future; Expected date: 02/07/2025  -     MRI Lumbar Spine Without Contrast; Future; Expected date: 02/07/2025    2. Acute pain  -     Ambulatory referral/consult to Pain Clinic    3. Lumbar radiculopathy          Natalie Davis is a 70 y.o. female patient who has a past medical history of Anemia, Asthma, Chronic diastolic CHF (congestive heart failure), Chronic respiratory failure with hypoxia, on home O2 therapy, CKD (chronic kidney disease), stage III, COPD (chronic obstructive pulmonary disease), Diabetes mellitus, GI bleed, Hyperkalemia, Hyperlipemia, Hypertension, Hypothyroidism, ILD (interstitial lung disease), Morbid obesity, Renal disorder, and Sleep apnea. She presents with back pain.  This has been a chronic problem her for many years.  She reports lower back pain that radiates down her left leg to the left foot.  Her pain is worse with sitting, standing, lying, bending, coughing, walking and can be relieved with some medications.  She use Tylenol on an as needed basis.    - on exam she has full strength of her lower extremities.  Intact sensation to light touch bilateral L2-S1  - I have ordered an x-ray of the lumbar spine to assess her bony anatomy and rule out any instability  - the patient reports that over the past 6 months she has completed formal physical therapy however she continues to report back and leg pain that limits her mobility and interfering with the quality of her life  - I am going to order a lumbar MRI to better evaluate her neural anatomy  - we will call her once her imaging is complete to discuss further treatment options.  I suspect she likely has some  narrowing in her lumbar spine causing lumbar radicular pain.  She may be a good candidate for lumbar SINCERE      : Not applicable    Everette Arredondo M.D.  Interventional Pain Medicine / Anesthesiology    This note was completed with dictation software and grammatical errors may exist.

## 2025-03-06 ENCOUNTER — TELEPHONE (OUTPATIENT)
Dept: PAIN MEDICINE | Facility: CLINIC | Age: 70
End: 2025-03-06
Payer: MEDICARE

## 2025-03-06 NOTE — TELEPHONE ENCOUNTER
Please let the patient know that she has a bulging disc in her lower back towards the left that is causing the pain going down her left leg.  I would be happy to schedule for a lumbar epidural    Physician - Dr Arredondo    Type of Procedure/Injection - Lumbar Epidural  L5/S1 to the left      Laterality - NA      Priority - Normal      Anxiolysis- RNIV      Need to hold medication - Yes      Aspirin for 4 days and Plavix for 5 days      Clearance needed - Yes      Follow up - 3 week

## 2025-03-07 ENCOUNTER — TELEPHONE (OUTPATIENT)
Dept: PAIN MEDICINE | Facility: CLINIC | Age: 70
End: 2025-03-07
Payer: MEDICARE

## 2025-03-07 DIAGNOSIS — M54.16 LUMBAR RADICULOPATHY: Primary | ICD-10-CM

## 2025-03-07 RX ORDER — SODIUM CHLORIDE, SODIUM LACTATE, POTASSIUM CHLORIDE, CALCIUM CHLORIDE 600; 310; 30; 20 MG/100ML; MG/100ML; MG/100ML; MG/100ML
INJECTION, SOLUTION INTRAVENOUS CONTINUOUS
OUTPATIENT
Start: 2025-03-07

## 2025-03-07 NOTE — TELEPHONE ENCOUNTER
Hi! Patient is scheduled for a lumbar SINCERE on 3/19 with Dr. Arredondo. Provider requests patient hold ASA x 4 and Plavix x 5 days prior to injection. Is patient cleared to hold ASA and Plavix for scheduled epidural steroid injection? Thanks.

## 2025-03-07 NOTE — TELEPHONE ENCOUNTER
Spoke with patient and discussed providers review and recommendation based off recent imaging. Patient agreeable to plan and scheduled for lumbar SINCERE with Dr. Arredondo. Per provider patient to hold ASA x 4 and Plavix x 5 days prior. Message to Dr. Miranda for clearance. Pre op information given and follow up appointment scheduled.

## 2025-03-12 ENCOUNTER — TELEPHONE (OUTPATIENT)
Dept: PAIN MEDICINE | Facility: CLINIC | Age: 70
End: 2025-03-12
Payer: MEDICARE